# Patient Record
Sex: FEMALE | Race: WHITE | NOT HISPANIC OR LATINO | URBAN - METROPOLITAN AREA
[De-identification: names, ages, dates, MRNs, and addresses within clinical notes are randomized per-mention and may not be internally consistent; named-entity substitution may affect disease eponyms.]

---
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
## 2019-09-16 ENCOUNTER — OFFICE VISIT - RIVER FALLS (OUTPATIENT)
Dept: FAMILY MEDICINE | Facility: CLINIC | Age: 19
End: 2019-09-16

## 2019-09-16 ASSESSMENT — MIFFLIN-ST. JEOR: SCORE: 1536.07

## 2022-02-11 VITALS
HEART RATE: 78 BPM | DIASTOLIC BLOOD PRESSURE: 66 MMHG | TEMPERATURE: 97.4 F | BODY MASS INDEX: 28.66 KG/M2 | WEIGHT: 172 LBS | OXYGEN SATURATION: 98 % | HEIGHT: 65 IN | SYSTOLIC BLOOD PRESSURE: 116 MMHG

## 2022-02-11 VITALS
TEMPERATURE: 98.5 F | WEIGHT: 139.6 LBS | HEART RATE: 74 BPM | DIASTOLIC BLOOD PRESSURE: 69 MMHG | SYSTOLIC BLOOD PRESSURE: 131 MMHG

## 2022-02-16 NOTE — PROGRESS NOTES
Chief Complaint    Pt c/o cough, nausea and SOB.  History of Present Illness      Clarita presents to the clinic with concerns regarding cough cold and nausea.  She has been ill approximately 3 days.  She denies any fevers cough is quite productive with moderate mucus causing her to have emesis x1.  She has ongoing nausea.  No chest pain or shortness of breath she is quite fatigued she denies any problems with chest pain.  She has had a very poor appetite but is tolerating fluids well.  Review of Systems      Review of systems is negative with the exception of those noted in HPI          Physical Exam   Vitals & Measurements    T: 97.4   F (Tympanic)  HR: 78(Peripheral)  BP: 116/66  SpO2: 98%     HT: 65 in  WT: 172 lb  BMI: 28.62           Vitals as above per nursing documentation           Constitutional : nad appears well          Ears: ears patent B, TMS intact, noninjected           Nose: nasal mucosa is non-ededmatous. no discharge           Throat: pharynx is nonerythematous, no tonsillar hypertrophy, no exudate           Neck: neck supple, no adenopathy, no thyromegaly, no rigidity           Lungs: lungs CTA', no Wheezes, rhonchi or rales           Heart: heart RRR, nl S1, S2 no murmur           skin:  No rashes              Assessment/Plan       1. Acute URI (J06.9)         conservative measures discussed. push fluids, rest and ibuprofen or tylenol for comfort.  Mucinex and Sudafed for sx and OTC cough med.       2. Nausea (R11.0)         Zofran as ordered.  Pt instructed to return to clinic for persistent or worsening symptoms.                  Orders:         ondansetron, = 1 tab(s) ( 4 mg ), Oral, tid, # 30 tab(s), 0 Refill(s), Type: Maintenance, Pharmacy: [facility], 1 tab(s) Oral tid, (Ordered)         ondansetron, = 1 tab(s) ( 4 mg ), Oral, q8 hrs, # 30 tab(s), 0 Refill(s), Type: Maintenance, (Ordered)  Patient Information     Name:BLAS DOWD      Address:      [address]      [address] [place] [id number]     Sex:Female     YOB: 2000     [phone]     Emergency Contact:JORGITO BOYER     MRN:[id number]     FIN:[id number]     Location:[facility]     Date of Service:09/16/2019      Primary Care Physician:       NONE ,       Attending Physician:       Victoria ODONNELL, Zehra LANCASTER, [phone]  Problem List/Past Medical History    Ongoing     No qualifying data    Historical     No qualifying data  Medications    Zofran 4 mg oral tablet, 4 mg= 1 tab(s), Oral, q8 hrs    Zofran ODT 4 mg oral tablet, disintegrating, 4 mg= 1 tab(s), Oral, tid  Allergies    penicillin  Social History    Smoking Status - 09/16/2019     Never smoker     Alcohol      Current, 1-2 times per week, 2 drinks/episode average., 03/02/2017     Exercise      Exercise frequency: Never., 03/02/2017     Home/Environment      Marital status: Single., 03/02/2017     Nutrition/Health      Type of diet: Regular., 03/02/2017     Sexual      Sexually active: No. Sexual orientation: Heterosexual., 03/02/2017     Substance Abuse      Never, 03/02/2017     Tobacco      Never smoker, 03/02/2017  Family History    Mother: History is negative    Father: History is negative
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
## 2022-02-16 NOTE — NURSING NOTE
Comprehensive Intake Entered On:  9/16/2019 11:59 AM CDT    Performed On:  9/16/2019 11:57 AM CDT by Janett Burnett               Summary   Chief Complaint :   Pt c/o cough, nausea and SOB.    Weight Measured :   172 lb(Converted to: 172 lb 0 oz, 78.02 kg)    Height Measured :   65 in(Converted to: 5 ft 5 in, 165.10 cm)    Body Mass Index :   28.62 kg/m2 (HI)    Body Surface Area :   1.89 m2   Systolic Blood Pressure :   116 mmHg   Diastolic Blood Pressure :   66 mmHg   Mean Arterial Pressure :   83 mmHg   Peripheral Pulse Rate :   78 bpm   Temperature Tympanic :   97.4 DegF(Converted to: 36.3 DegC)  (LOW)    Oxygen Saturation :   98 %   Janett Burnett - 9/16/2019 11:57 AM CDT   Health Status   Allergies Verified? :   Yes   Medication History Verified? :   Yes   Medical History Verified? :   No   Pre-Visit Planning Status :   Completed   Tobacco Use? :   Never smoker   Janett Burnett - 9/16/2019 11:57 AM CDT   Meds / Allergies   (As Of: 9/16/2019 11:59:54 AM CDT)   Allergies (Active)   penicillin  Estimated Onset Date:   Unspecified ; Created By:   Pauline Renee MA; Reaction Status:   Active ; Category:   Drug ; Substance:   penicillin ; Type:   Allergy ; Updated By:   Pauline Renee MA; Reviewed Date:   9/16/2019 11:57 AM CDT        Medication List   (As Of: 9/16/2019 11:59:54 AM CDT)   No Known Home Medications     Janett Burnett - 9/16/2019 11:57:44 AM